# Patient Record
Sex: FEMALE | Race: OTHER | HISPANIC OR LATINO | URBAN - METROPOLITAN AREA
[De-identification: names, ages, dates, MRNs, and addresses within clinical notes are randomized per-mention and may not be internally consistent; named-entity substitution may affect disease eponyms.]

---

## 2017-08-01 ENCOUNTER — EMERGENCY (EMERGENCY)
Facility: HOSPITAL | Age: 27
LOS: 1 days | Discharge: HOME | End: 2017-08-01
Attending: EMERGENCY MEDICINE
Payer: COMMERCIAL

## 2017-08-01 VITALS
TEMPERATURE: 98 F | HEART RATE: 82 BPM | HEIGHT: 61 IN | WEIGHT: 125 LBS | OXYGEN SATURATION: 99 % | SYSTOLIC BLOOD PRESSURE: 109 MMHG | RESPIRATION RATE: 18 BRPM | DIASTOLIC BLOOD PRESSURE: 69 MMHG

## 2017-08-01 LAB — HCG UR QL: NEGATIVE — SIGNIFICANT CHANGE UP

## 2017-08-01 PROCEDURE — 99284 EMERGENCY DEPT VISIT MOD MDM: CPT | Mod: 25

## 2017-08-01 RX ORDER — ACETAMINOPHEN 500 MG
650 TABLET ORAL ONCE
Qty: 0 | Refills: 0 | Status: COMPLETED | OUTPATIENT
Start: 2017-08-01 | End: 2017-08-01

## 2017-08-01 RX ADMIN — Medication 650 MILLIGRAM(S): at 22:53

## 2017-08-01 RX ADMIN — Medication 650 MILLIGRAM(S): at 23:30

## 2017-08-01 NOTE — ED PROVIDER NOTE - ATTENDING CONTRIBUTION TO CARE
SEEN WITH PA. NICCI IN FACE DURING MARTIAL ARTS LESSON. DIZZY. PAIN TO FACE. HEADACHE. WILL CHECK CTS HEAD AND MAXILLOFACIAL AND REEVALUATE

## 2017-08-01 NOTE — ED PROVIDER NOTE - PHYSICAL EXAMINATION
PE- Well developed, well-nourish, resting comfortably in NAD. Ears: No hemotympanum Nose: no epistaxis, no septal hematoma Eyes: PERRLA b/l, EOM intact b/l with no nystagmus or pain with EOM.  Mouth: no loose teeth. Face: No raccoon eyes. No gonzalez signs. No tenderness to maxilla or mandible. Able to open and close mouth with no pain. TTP to right lower orbit with associated bruising. No step offs. Head: No contusions Cardiac- +S1, S2, without murmurs, rubs, or gallops. Pulm- lungs CTA without wheezes, ronchi, or rales. No retractions.  Abdomen- No seatbelt sign. BS normoactive. Soft, nontender to palpation. MS: No bruising to back. No step-offs. No midline tenderness. Neuro: CN 2-12 intact. Reflexes intact. Gait intact. Negative pronator. Negative rhomberg. Finger to nose intact. Rapid alternating motion intact. Heel to shin intact. No focal deficits, A&Ox3, no gross sensory or motor deficits. Plan: Will f/u plan per intake physician. PE- Well developed, well-nourish, resting comfortably in NAD. Ears: No hemotympanum Nose: no epistaxis, no septal hematoma Eyes: PERRLA b/l, EOM intact b/l with no nystagmus or pain with EOM. No limitation of eye movement in all directions.  Mouth: no loose teeth. Face: No raccoon eyes. No gonzalez signs. No tenderness to maxilla or mandible. Able to open and close mouth with no pain. TTP to right lower orbit with associated small bruise. No step offs. No crepitus. Head: No contusions Cardiac- +S1, S2, without murmurs, rubs, or gallops. Pulm- lungs CTA without wheezes, ronchi, or rales. No retractions.  Abdomen- No seatbelt sign. BS normoactive. Soft, nontender to palpation. MS: No bruising to back. No step-offs. No midline tenderness. Neuro: CN 2-12 intact. Reflexes intact. Gait intact. Negative pronator. Negative rhomberg. Finger to nose intact. Rapid alternating motion intact. Heel to shin intact. No focal deficits, A&Ox3, no gross sensory or motor deficits. Plan: Will f/u plan per intake physician. PE- Well developed, well-nourish, resting comfortably in NAD. Ears: No hemotympanum Nose: no epistaxis, no septal hematoma Eyes: PERRLA b/l, EOM intact b/l with no nystagmus or pain with EOM. No limitation of eye movement in all directions.  No pain to eye. 20/20 b/l Mouth: no loose teeth. Face: No raccoon eyes. No gonzalez signs. No tenderness to maxilla or mandible. Able to open and close mouth with no pain. TTP to right lower orbit with associated small bruise. No step offs. No crepitus. Head: No contusions Cardiac- +S1, S2, without murmurs, rubs, or gallops. Pulm- lungs CTA without wheezes, ronchi, or rales. No retractions.  Abdomen- No seatbelt sign. BS normoactive. Soft, nontender to palpation. MS: No bruising to back. No step-offs. No midline tenderness. Neuro: CN 2-12 intact. Reflexes intact. Gait intact. Negative pronator. Negative rhomberg. Finger to nose intact. Rapid alternating motion intact. Heel to shin intact. No focal deficits, A&Ox3, no gross sensory or motor deficits. Plan: Will f/u plan per intake physician.

## 2017-08-01 NOTE — ED PROVIDER NOTE - OBJECTIVE STATEMENT
This is a 25 y/o female presenting to the ED after getting a knee to her right lower orbit during wresting during jiu jitsu 2 hours ago.  Pt admits to headache, nausea, dizziness. Also admits to bruising to right lower orbit and tenderness to this area. She denies bleeding or clotting d/o, confusion, gait instability, blurry vision, loc, pain with EOM.

## 2017-08-01 NOTE — ED PROVIDER NOTE - MEDICAL DECISION MAKING DETAILS
Orbital Wall Fracture: CT shows fx. Counseled pt on all aspects of CT results. No clinical evidence of entrapment. No blowing nose. Afrin. F/u with plastics this week. Motrin for pain.

## 2017-08-02 PROCEDURE — 81025 URINE PREGNANCY TEST: CPT

## 2017-08-02 PROCEDURE — 70450 CT HEAD/BRAIN W/O DYE: CPT | Mod: 26

## 2017-08-02 PROCEDURE — 70450 CT HEAD/BRAIN W/O DYE: CPT

## 2017-08-02 PROCEDURE — 70486 CT MAXILLOFACIAL W/O DYE: CPT | Mod: 26

## 2017-08-02 PROCEDURE — 99284 EMERGENCY DEPT VISIT MOD MDM: CPT | Mod: 25

## 2017-08-02 PROCEDURE — 70486 CT MAXILLOFACIAL W/O DYE: CPT

## 2017-08-02 RX ORDER — OXYMETAZOLINE HYDROCHLORIDE 0.5 MG/ML
1 SPRAY NASAL ONCE
Qty: 0 | Refills: 0 | Status: COMPLETED | OUTPATIENT
Start: 2017-08-02 | End: 2017-08-02

## 2017-08-02 RX ADMIN — OXYMETAZOLINE HYDROCHLORIDE 1 SPRAY(S): 0.5 SPRAY NASAL at 01:40

## 2020-04-26 ENCOUNTER — MESSAGE (OUTPATIENT)
Age: 30
End: 2020-04-26

## 2021-11-01 ENCOUNTER — EMERGENCY (EMERGENCY)
Facility: HOSPITAL | Age: 31
LOS: 1 days | Discharge: HOME | End: 2021-11-01
Payer: COMMERCIAL

## 2021-11-01 VITALS
HEIGHT: 61 IN | DIASTOLIC BLOOD PRESSURE: 71 MMHG | HEART RATE: 78 BPM | OXYGEN SATURATION: 98 % | RESPIRATION RATE: 20 BRPM | TEMPERATURE: 98 F | SYSTOLIC BLOOD PRESSURE: 107 MMHG

## 2021-11-01 LAB — SARS-COV-2 RNA SPEC QL NAA+PROBE: SIGNIFICANT CHANGE UP

## 2021-11-01 PROCEDURE — U0003: CPT

## 2021-11-01 PROCEDURE — 99282 EMERGENCY DEPT VISIT SF MDM: CPT

## 2021-11-01 PROCEDURE — U0005: CPT

## 2021-11-01 PROCEDURE — 99283 EMERGENCY DEPT VISIT LOW MDM: CPT

## 2021-11-01 NOTE — ED ADULT TRIAGE NOTE - TEMPERATURE IN CELSIUS (DEGREES C)
36.7 Modified Advancement Flap Text: The defect edges were debeveled with a #15 scalpel blade.  Given the location of the defect, shape of the defect and the proximity to free margins a modified advancement flap was deemed most appropriate.  Using a sterile surgical marker, an appropriate advancement flap was drawn incorporating the defect and placing the expected incisions within the relaxed skin tension lines where possible.    The area thus outlined was incised deep to adipose tissue with a #15 scalpel blade.  The skin margins were undermined to an appropriate distance in all directions utilizing iris scissors.

## 2021-11-01 NOTE — ED PROVIDER NOTE - PATIENT PORTAL LINK FT
You can access the FollowMyHealth Patient Portal offered by [facility] by registering at the following website: [email or web address]. By joining [employer]’s FollowMyHealth portal, you will also be able to view your health information using other applications (apps) compatible with our system.

## 2021-11-01 NOTE — ED PROVIDER NOTE - OBJECTIVE STATEMENT
Nursing Note by Mallorie Orr CMA at 10/23/18 01:41 PM     Author:  Mallorie Orr CMA Service:  (none) Author Type:  Certified Medical Assistant     Filed:  10/23/18 01:41 PM Encounter Date:  10/23/2018 Status:  Signed     :  Mallorie Orr CMA (Certified Medical Assistant)            If provider orders tests at today's visit, patient would like to be contacted via phone, patient's preferred phone # is [phone] (home) and it is ok to leave a detailed message on voice mail or with family member.  If medications are ordered at today's visit, the pharmacy name/location patient would like them to be sent to is [facility][LP1.1C]      Revision History        User Key Date/Time User Provider Type Action    > LP1.1 10/23/18 01:41 PM Mallorie Orr CMA Certified Medical Assistant Sign    C - Copied            
29 y/o F requesting covid test for travel.  Denies symptoms.

## 2022-03-30 ENCOUNTER — APPOINTMENT (OUTPATIENT)
Dept: OBGYN | Facility: CLINIC | Age: 32
End: 2022-03-30
Payer: MEDICAID

## 2022-03-30 VITALS
HEIGHT: 61 IN | BODY MASS INDEX: 24.35 KG/M2 | DIASTOLIC BLOOD PRESSURE: 64 MMHG | SYSTOLIC BLOOD PRESSURE: 96 MMHG | WEIGHT: 129 LBS

## 2022-03-30 DIAGNOSIS — Z87.42 PERSONAL HISTORY OF OTHER DISEASES OF THE FEMALE GENITAL TRACT: ICD-10-CM

## 2022-03-30 DIAGNOSIS — Z80.3 FAMILY HISTORY OF MALIGNANT NEOPLASM OF BREAST: ICD-10-CM

## 2022-03-30 DIAGNOSIS — Z78.9 OTHER SPECIFIED HEALTH STATUS: ICD-10-CM

## 2022-03-30 PROCEDURE — 99203 OFFICE O/P NEW LOW 30 MIN: CPT

## 2022-03-31 ENCOUNTER — LABORATORY RESULT (OUTPATIENT)
Age: 32
End: 2022-03-31

## 2022-04-19 ENCOUNTER — NON-APPOINTMENT (OUTPATIENT)
Age: 32
End: 2022-04-19

## 2022-04-20 ENCOUNTER — APPOINTMENT (OUTPATIENT)
Dept: ANTEPARTUM | Facility: CLINIC | Age: 32
End: 2022-04-20
Payer: MEDICAID

## 2022-04-20 ENCOUNTER — APPOINTMENT (OUTPATIENT)
Dept: OBGYN | Facility: CLINIC | Age: 32
End: 2022-04-20
Payer: MEDICAID

## 2022-04-20 ENCOUNTER — ASOB RESULT (OUTPATIENT)
Age: 32
End: 2022-04-20

## 2022-04-20 VITALS
SYSTOLIC BLOOD PRESSURE: 110 MMHG | HEIGHT: 61 IN | DIASTOLIC BLOOD PRESSURE: 70 MMHG | WEIGHT: 131 LBS | BODY MASS INDEX: 24.73 KG/M2

## 2022-04-20 PROCEDURE — 99213 OFFICE O/P EST LOW 20 MIN: CPT | Mod: TH

## 2022-04-20 PROCEDURE — 76805 OB US >/= 14 WKS SNGL FETUS: CPT

## 2022-04-20 PROCEDURE — 76817 TRANSVAGINAL US OBSTETRIC: CPT

## 2022-05-19 ENCOUNTER — APPOINTMENT (OUTPATIENT)
Dept: OBGYN | Facility: CLINIC | Age: 32
End: 2022-05-19
Payer: MEDICAID

## 2022-05-19 VITALS
SYSTOLIC BLOOD PRESSURE: 126 MMHG | DIASTOLIC BLOOD PRESSURE: 82 MMHG | HEIGHT: 61 IN | BODY MASS INDEX: 25.86 KG/M2 | WEIGHT: 137 LBS

## 2022-05-19 PROCEDURE — 99213 OFFICE O/P EST LOW 20 MIN: CPT | Mod: TH

## 2022-05-26 ENCOUNTER — LABORATORY RESULT (OUTPATIENT)
Age: 32
End: 2022-05-26

## 2022-06-14 ENCOUNTER — NON-APPOINTMENT (OUTPATIENT)
Age: 32
End: 2022-06-14

## 2022-06-15 ENCOUNTER — APPOINTMENT (OUTPATIENT)
Dept: OBGYN | Facility: CLINIC | Age: 32
End: 2022-06-15
Payer: MEDICAID

## 2022-06-15 VITALS
DIASTOLIC BLOOD PRESSURE: 60 MMHG | SYSTOLIC BLOOD PRESSURE: 100 MMHG | WEIGHT: 140 LBS | BODY MASS INDEX: 26.43 KG/M2 | HEIGHT: 61 IN

## 2022-06-15 PROCEDURE — 99213 OFFICE O/P EST LOW 20 MIN: CPT | Mod: TH

## 2022-07-07 ENCOUNTER — APPOINTMENT (OUTPATIENT)
Dept: OBGYN | Facility: CLINIC | Age: 32
End: 2022-07-07

## 2022-07-07 VITALS
DIASTOLIC BLOOD PRESSURE: 60 MMHG | WEIGHT: 144 LBS | HEIGHT: 61 IN | BODY MASS INDEX: 27.19 KG/M2 | SYSTOLIC BLOOD PRESSURE: 104 MMHG

## 2022-07-07 PROCEDURE — 99213 OFFICE O/P EST LOW 20 MIN: CPT | Mod: TH

## 2022-07-20 ENCOUNTER — APPOINTMENT (OUTPATIENT)
Dept: OBGYN | Facility: CLINIC | Age: 32
End: 2022-07-20

## 2022-07-20 ENCOUNTER — ASOB RESULT (OUTPATIENT)
Age: 32
End: 2022-07-20

## 2022-07-20 ENCOUNTER — APPOINTMENT (OUTPATIENT)
Dept: ANTEPARTUM | Facility: CLINIC | Age: 32
End: 2022-07-20

## 2022-07-20 VITALS
WEIGHT: 145 LBS | HEIGHT: 61 IN | SYSTOLIC BLOOD PRESSURE: 110 MMHG | DIASTOLIC BLOOD PRESSURE: 70 MMHG | BODY MASS INDEX: 27.38 KG/M2

## 2022-07-20 PROCEDURE — 76816 OB US FOLLOW-UP PER FETUS: CPT

## 2022-07-20 PROCEDURE — 99213 OFFICE O/P EST LOW 20 MIN: CPT | Mod: TH

## 2022-08-02 ENCOUNTER — NON-APPOINTMENT (OUTPATIENT)
Age: 32
End: 2022-08-02

## 2022-08-03 ENCOUNTER — APPOINTMENT (OUTPATIENT)
Dept: OBGYN | Facility: CLINIC | Age: 32
End: 2022-08-03

## 2022-08-03 VITALS
WEIGHT: 145 LBS | DIASTOLIC BLOOD PRESSURE: 74 MMHG | HEIGHT: 61 IN | SYSTOLIC BLOOD PRESSURE: 110 MMHG | BODY MASS INDEX: 27.38 KG/M2

## 2022-08-03 PROCEDURE — 99213 OFFICE O/P EST LOW 20 MIN: CPT | Mod: TH

## 2022-08-05 LAB
GP B STREP DNA SPEC QL NAA+PROBE: NORMAL
GP B STREP DNA SPEC QL NAA+PROBE: NOT DETECTED
SOURCE GBS: NORMAL

## 2022-08-10 ENCOUNTER — ASOB RESULT (OUTPATIENT)
Age: 32
End: 2022-08-10

## 2022-08-10 ENCOUNTER — NON-APPOINTMENT (OUTPATIENT)
Age: 32
End: 2022-08-10

## 2022-08-10 ENCOUNTER — APPOINTMENT (OUTPATIENT)
Dept: OBGYN | Facility: CLINIC | Age: 32
End: 2022-08-10

## 2022-08-10 ENCOUNTER — APPOINTMENT (OUTPATIENT)
Dept: ANTEPARTUM | Facility: CLINIC | Age: 32
End: 2022-08-10

## 2022-08-10 VITALS
WEIGHT: 148 LBS | SYSTOLIC BLOOD PRESSURE: 110 MMHG | HEIGHT: 61 IN | DIASTOLIC BLOOD PRESSURE: 70 MMHG | BODY MASS INDEX: 27.94 KG/M2

## 2022-08-10 LAB
BASOPHILS # BLD AUTO: 0.03 K/UL
BASOPHILS NFR BLD AUTO: 0.4 %
EOSINOPHIL # BLD AUTO: 0.04 K/UL
EOSINOPHIL NFR BLD AUTO: 0.5 %
HCT VFR BLD CALC: 34.1 %
HGB BLD-MCNC: 11 G/DL
HIV1+2 AB SPEC QL IA.RAPID: NONREACTIVE
IMM GRANULOCYTES NFR BLD AUTO: 0.9 %
LYMPHOCYTES # BLD AUTO: 1.35 K/UL
LYMPHOCYTES NFR BLD AUTO: 18.1 %
MAN DIFF?: NORMAL
MCHC RBC-ENTMCNC: 28.7 PG
MCHC RBC-ENTMCNC: 32.3 GM/DL
MCV RBC AUTO: 89 FL
MONOCYTES # BLD AUTO: 0.48 K/UL
MONOCYTES NFR BLD AUTO: 6.4 %
NEUTROPHILS # BLD AUTO: 5.49 K/UL
NEUTROPHILS NFR BLD AUTO: 73.7 %
PLATELET # BLD AUTO: 222 K/UL
RBC # BLD: 3.83 M/UL
RBC # FLD: 13.8 %
T PALLIDUM AB SER QL IA: NEGATIVE
WBC # FLD AUTO: 7.46 K/UL

## 2022-08-10 PROCEDURE — 76819 FETAL BIOPHYS PROFIL W/O NST: CPT

## 2022-08-10 PROCEDURE — 99213 OFFICE O/P EST LOW 20 MIN: CPT | Mod: TH

## 2022-08-10 PROCEDURE — 76816 OB US FOLLOW-UP PER FETUS: CPT

## 2022-08-17 ENCOUNTER — APPOINTMENT (OUTPATIENT)
Dept: OBGYN | Facility: CLINIC | Age: 32
End: 2022-08-17

## 2022-08-17 VITALS
DIASTOLIC BLOOD PRESSURE: 70 MMHG | HEIGHT: 61 IN | SYSTOLIC BLOOD PRESSURE: 110 MMHG | WEIGHT: 149 LBS | BODY MASS INDEX: 28.13 KG/M2

## 2022-08-17 PROCEDURE — 99213 OFFICE O/P EST LOW 20 MIN: CPT | Mod: TH

## 2022-08-26 ENCOUNTER — APPOINTMENT (OUTPATIENT)
Dept: OBGYN | Facility: CLINIC | Age: 32
End: 2022-08-26

## 2022-08-26 VITALS
WEIGHT: 149 LBS | BODY MASS INDEX: 28.13 KG/M2 | DIASTOLIC BLOOD PRESSURE: 70 MMHG | HEIGHT: 61 IN | SYSTOLIC BLOOD PRESSURE: 100 MMHG

## 2022-08-26 PROCEDURE — 99213 OFFICE O/P EST LOW 20 MIN: CPT | Mod: TH

## 2022-08-30 ENCOUNTER — NON-APPOINTMENT (OUTPATIENT)
Age: 32
End: 2022-08-30

## 2022-08-31 ENCOUNTER — APPOINTMENT (OUTPATIENT)
Dept: OBGYN | Facility: CLINIC | Age: 32
End: 2022-08-31

## 2022-08-31 VITALS
SYSTOLIC BLOOD PRESSURE: 110 MMHG | WEIGHT: 150 LBS | DIASTOLIC BLOOD PRESSURE: 72 MMHG | HEIGHT: 61 IN | HEART RATE: 97 BPM | BODY MASS INDEX: 28.32 KG/M2

## 2022-08-31 DIAGNOSIS — Z34.92 ENCOUNTER FOR SUPERVISION OF NORMAL PREGNANCY, UNSPECIFIED, SECOND TRIMESTER: ICD-10-CM

## 2022-08-31 PROCEDURE — 76818 FETAL BIOPHYS PROFILE W/NST: CPT

## 2022-08-31 PROCEDURE — 76815 OB US LIMITED FETUS(S): CPT

## 2022-08-31 PROCEDURE — 99213 OFFICE O/P EST LOW 20 MIN: CPT | Mod: TH

## 2022-09-02 ENCOUNTER — INPATIENT (INPATIENT)
Facility: HOSPITAL | Age: 32
LOS: 2 days | Discharge: HOME | End: 2022-09-05
Attending: OBSTETRICS & GYNECOLOGY | Admitting: OBSTETRICS & GYNECOLOGY
Payer: COMMERCIAL

## 2022-09-02 ENCOUNTER — APPOINTMENT (OUTPATIENT)
Dept: OBGYN | Facility: CLINIC | Age: 32
End: 2022-09-02

## 2022-09-02 VITALS — TEMPERATURE: 98 F | WEIGHT: 151.02 LBS | HEIGHT: 61 IN | RESPIRATION RATE: 16 BRPM

## 2022-09-02 VITALS
BODY MASS INDEX: 28.51 KG/M2 | WEIGHT: 151 LBS | SYSTOLIC BLOOD PRESSURE: 118 MMHG | HEIGHT: 61 IN | DIASTOLIC BLOOD PRESSURE: 80 MMHG

## 2022-09-02 DIAGNOSIS — O47.1 FALSE LABOR AT OR AFTER 37 COMPLETED WEEKS OF GESTATION: ICD-10-CM

## 2022-09-02 DIAGNOSIS — O48.0 POST-TERM PREGNANCY: ICD-10-CM

## 2022-09-02 DIAGNOSIS — Z98.890 OTHER SPECIFIED POSTPROCEDURAL STATES: Chronic | ICD-10-CM

## 2022-09-02 DIAGNOSIS — Z3A.40 40 WEEKS GESTATION OF PREGNANCY: ICD-10-CM

## 2022-09-02 DIAGNOSIS — Z34.93 ENCOUNTER FOR SUPERVISION OF NORMAL PREGNANCY, UNSPECIFIED, THIRD TRIMESTER: ICD-10-CM

## 2022-09-02 DIAGNOSIS — O41.03X0 OLIGOHYDRAMNIOS, THIRD TRIMESTER, NOT APPLICABLE OR UNSPECIFIED: ICD-10-CM

## 2022-09-02 DIAGNOSIS — O41.00X0 OLIGOHYDRAMNIOS, UNSPECIFIED TRIMESTER, NOT APPLICABLE OR UNSPECIFIED: ICD-10-CM

## 2022-09-02 DIAGNOSIS — O41.03X1 OLIGOHYDRAMNIOS, THIRD TRIMESTER, FETUS 1: ICD-10-CM

## 2022-09-02 LAB
BASOPHILS # BLD AUTO: 0.03 K/UL — SIGNIFICANT CHANGE UP (ref 0–0.2)
BASOPHILS NFR BLD AUTO: 0.3 % — SIGNIFICANT CHANGE UP (ref 0–2)
BLD GP AB SCN SERPL QL: SIGNIFICANT CHANGE UP
EOSINOPHIL # BLD AUTO: 0.05 K/UL — SIGNIFICANT CHANGE UP (ref 0–0.5)
EOSINOPHIL NFR BLD AUTO: 0.5 % — SIGNIFICANT CHANGE UP (ref 0–6)
HCT VFR BLD CALC: 34 % — LOW (ref 34.5–45)
HGB BLD-MCNC: 11.1 G/DL — LOW (ref 11.5–15.5)
IMM GRANULOCYTES NFR BLD AUTO: 0.7 % — SIGNIFICANT CHANGE UP (ref 0–1.5)
LYMPHOCYTES # BLD AUTO: 1.75 K/UL — SIGNIFICANT CHANGE UP (ref 1–3.3)
LYMPHOCYTES # BLD AUTO: 17.2 % — SIGNIFICANT CHANGE UP (ref 13–44)
MCHC RBC-ENTMCNC: 28.1 PG — SIGNIFICANT CHANGE UP (ref 27–34)
MCHC RBC-ENTMCNC: 32.6 GM/DL — SIGNIFICANT CHANGE UP (ref 32–36)
MCV RBC AUTO: 86.1 FL — SIGNIFICANT CHANGE UP (ref 80–100)
MONOCYTES # BLD AUTO: 0.81 K/UL — SIGNIFICANT CHANGE UP (ref 0–0.9)
MONOCYTES NFR BLD AUTO: 8 % — SIGNIFICANT CHANGE UP (ref 2–14)
NEUTROPHILS # BLD AUTO: 7.47 K/UL — HIGH (ref 1.8–7.4)
NEUTROPHILS NFR BLD AUTO: 73.3 % — SIGNIFICANT CHANGE UP (ref 43–77)
PLATELET # BLD AUTO: 231 K/UL — SIGNIFICANT CHANGE UP (ref 150–400)
RBC # BLD: 3.95 M/UL — SIGNIFICANT CHANGE UP (ref 3.8–5.2)
RBC # FLD: 13.5 % — SIGNIFICANT CHANGE UP (ref 10.3–14.5)
WBC # BLD: 10.18 K/UL — SIGNIFICANT CHANGE UP (ref 3.8–10.5)
WBC # FLD AUTO: 10.18 K/UL — SIGNIFICANT CHANGE UP (ref 3.8–10.5)

## 2022-09-02 PROCEDURE — 76818 FETAL BIOPHYS PROFILE W/NST: CPT

## 2022-09-02 PROCEDURE — 99213 OFFICE O/P EST LOW 20 MIN: CPT | Mod: TH

## 2022-09-02 RX ORDER — CHLORHEXIDINE GLUCONATE 213 G/1000ML
1 SOLUTION TOPICAL ONCE
Refills: 0 | Status: DISCONTINUED | OUTPATIENT
Start: 2022-09-02 | End: 2022-09-03

## 2022-09-02 RX ORDER — CITRIC ACID/SODIUM CITRATE 300-500 MG
30 SOLUTION, ORAL ORAL ONCE
Refills: 0 | Status: DISCONTINUED | OUTPATIENT
Start: 2022-09-02 | End: 2022-09-03

## 2022-09-02 RX ORDER — OXYTOCIN 10 UNIT/ML
333.33 VIAL (ML) INJECTION
Qty: 20 | Refills: 0 | Status: DISCONTINUED | OUTPATIENT
Start: 2022-09-02 | End: 2022-09-05

## 2022-09-02 RX ORDER — SODIUM CHLORIDE 9 MG/ML
1000 INJECTION, SOLUTION INTRAVENOUS
Refills: 0 | Status: DISCONTINUED | OUTPATIENT
Start: 2022-09-02 | End: 2022-09-03

## 2022-09-02 RX ORDER — DINOPROSTONE 10 MG/241MG
10 INSERT VAGINAL ONCE
Refills: 0 | Status: DISCONTINUED | OUTPATIENT
Start: 2022-09-02 | End: 2022-09-03

## 2022-09-02 RX ADMIN — SODIUM CHLORIDE 125 MILLILITER(S): 9 INJECTION, SOLUTION INTRAVENOUS at 19:36

## 2022-09-02 NOTE — OB RN PATIENT PROFILE - FALL HARM RISK - UNIVERSAL INTERVENTIONS
Bed in lowest position, wheels locked, appropriate side rails in place/Call bell, personal items and telephone in reach/Instruct patient to call for assistance before getting out of bed or chair/Non-slip footwear when patient is out of bed/[place] to call system/Physically safe environment - no spills, clutter or unnecessary equipment/Purposeful Proactive Rounding/Room/bathroom lighting operational, light cord in reach

## 2022-09-02 NOTE — OB PROVIDER H&P - ASSESSMENT
Patient is a 31 y.o.  at 40 weeks 3 days gestation admitted for IOL for oligohydramnios. Cat 1 FHT.     - consent  - admission labs  - IVF  - GBS negative, no ppx required  - IOL with cervical ripening balloon as patient is iva too often for medical induction  - continuous toco and fetal heart monitoring  - pain management: epidural PRN    D/w Dr. Sargent

## 2022-09-02 NOTE — OB PROVIDER H&P - HISTORY OF PRESENT ILLNESS
Марина Arredondo is a 31 y.o.  at 40 weeks 3 days gestation who presents to L&D for a scheduled IOL secondary to oligohydramnios.   +FM, - LOF, - VB, +CTX.     ObHx:  - TOP at "5 months", s/p D&E  GynHx: denies abnormal paps, STIs, ovarian cysts, fibroids  PMH: denies  PSH: denies  Meds: PNV  All: NKDA  SocialHx: denies alcohol, tobacco use, and drug use. Feels safe at home.

## 2022-09-02 NOTE — OB PROVIDER H&P - NSHPPHYSICALEXAM_GEN_ALL_CORE
Vital Signs Last 24 Hrs  T(C): 36.9 (02 Sep 2022 18:58), Max: 36.9 (02 Sep 2022 18:58)  T(F): 98.4 (02 Sep 2022 18:58), Max: 98.4 (02 Sep 2022 18:58)  HR: 78 (02 Sep 2022 19:02) (78 - 78)  BP: 114/71 (02 Sep 2022 19:02) (114/71 - 114/71)  RR: 16 (02 Sep 2022 18:58) (16 - 16)      Parameters below as of 02 Sep 2022 18:58  Patient On (Oxygen Delivery Method): room air    Gen: NAD  Abd: gravid, non-tender  SVE: 1/60/-2 on recheck 2 hours later, same exam  FHT: 135 baseline, moderate variability, + accels, - decels  Balm: q2-3 minutes  Bedside US: vertex, anterior placenta

## 2022-09-03 ENCOUNTER — TRANSCRIPTION ENCOUNTER (OUTPATIENT)
Age: 32
End: 2022-09-03

## 2022-09-03 LAB
ABO RH CONFIRMATION: SIGNIFICANT CHANGE UP
COVID-19 SPIKE DOMAIN AB INTERP: POSITIVE
COVID-19 SPIKE DOMAIN ANTIBODY RESULT: >250 U/ML — HIGH
SARS-COV-2 IGG+IGM SERPL QL IA: >250 U/ML — HIGH
SARS-COV-2 IGG+IGM SERPL QL IA: POSITIVE
SARS-COV-2 RNA SPEC QL NAA+PROBE: SIGNIFICANT CHANGE UP
T PALLIDUM AB TITR SER: NEGATIVE — SIGNIFICANT CHANGE UP

## 2022-09-03 PROCEDURE — 59409 OBSTETRICAL CARE: CPT | Mod: U9

## 2022-09-03 RX ORDER — IBUPROFEN 200 MG
600 TABLET ORAL EVERY 6 HOURS
Refills: 0 | Status: COMPLETED | OUTPATIENT
Start: 2022-09-03 | End: 2023-08-02

## 2022-09-03 RX ORDER — AER TRAVELER 0.5 G/1
1 SOLUTION RECTAL; TOPICAL EVERY 4 HOURS
Refills: 0 | Status: DISCONTINUED | OUTPATIENT
Start: 2022-09-03 | End: 2022-09-05

## 2022-09-03 RX ORDER — OXYCODONE HYDROCHLORIDE 5 MG/1
5 TABLET ORAL
Refills: 0 | Status: DISCONTINUED | OUTPATIENT
Start: 2022-09-03 | End: 2022-09-05

## 2022-09-03 RX ORDER — DIPHENHYDRAMINE HCL 50 MG
25 CAPSULE ORAL EVERY 6 HOURS
Refills: 0 | Status: DISCONTINUED | OUTPATIENT
Start: 2022-09-03 | End: 2022-09-05

## 2022-09-03 RX ORDER — IBUPROFEN 200 MG
600 TABLET ORAL EVERY 6 HOURS
Refills: 0 | Status: DISCONTINUED | OUTPATIENT
Start: 2022-09-03 | End: 2022-09-05

## 2022-09-03 RX ORDER — SIMETHICONE 80 MG/1
80 TABLET, CHEWABLE ORAL EVERY 4 HOURS
Refills: 0 | Status: DISCONTINUED | OUTPATIENT
Start: 2022-09-03 | End: 2022-09-05

## 2022-09-03 RX ORDER — PRAMOXINE HYDROCHLORIDE 150 MG/15G
1 AEROSOL, FOAM RECTAL EVERY 4 HOURS
Refills: 0 | Status: DISCONTINUED | OUTPATIENT
Start: 2022-09-03 | End: 2022-09-05

## 2022-09-03 RX ORDER — MAGNESIUM HYDROXIDE 400 MG/1
30 TABLET, CHEWABLE ORAL
Refills: 0 | Status: DISCONTINUED | OUTPATIENT
Start: 2022-09-03 | End: 2022-09-05

## 2022-09-03 RX ORDER — SODIUM CHLORIDE 9 MG/ML
3 INJECTION INTRAMUSCULAR; INTRAVENOUS; SUBCUTANEOUS EVERY 8 HOURS
Refills: 0 | Status: DISCONTINUED | OUTPATIENT
Start: 2022-09-03 | End: 2022-09-05

## 2022-09-03 RX ORDER — BENZOCAINE 10 %
1 GEL (GRAM) MUCOUS MEMBRANE EVERY 6 HOURS
Refills: 0 | Status: DISCONTINUED | OUTPATIENT
Start: 2022-09-03 | End: 2022-09-05

## 2022-09-03 RX ORDER — OXYTOCIN 10 UNIT/ML
VIAL (ML) INJECTION
Qty: 30 | Refills: 0 | Status: DISCONTINUED | OUTPATIENT
Start: 2022-09-03 | End: 2022-09-03

## 2022-09-03 RX ORDER — ONDANSETRON 8 MG/1
4 TABLET, FILM COATED ORAL ONCE
Refills: 0 | Status: COMPLETED | OUTPATIENT
Start: 2022-09-03 | End: 2022-09-03

## 2022-09-03 RX ORDER — DIBUCAINE 1 %
1 OINTMENT (GRAM) RECTAL EVERY 6 HOURS
Refills: 0 | Status: DISCONTINUED | OUTPATIENT
Start: 2022-09-03 | End: 2022-09-05

## 2022-09-03 RX ORDER — LANOLIN
1 OINTMENT (GRAM) TOPICAL EVERY 6 HOURS
Refills: 0 | Status: DISCONTINUED | OUTPATIENT
Start: 2022-09-03 | End: 2022-09-05

## 2022-09-03 RX ORDER — ACETAMINOPHEN 500 MG
975 TABLET ORAL
Refills: 0 | Status: DISCONTINUED | OUTPATIENT
Start: 2022-09-03 | End: 2022-09-05

## 2022-09-03 RX ORDER — KETOROLAC TROMETHAMINE 30 MG/ML
30 SYRINGE (ML) INJECTION ONCE
Refills: 0 | Status: DISCONTINUED | OUTPATIENT
Start: 2022-09-03 | End: 2022-09-05

## 2022-09-03 RX ORDER — TETANUS TOXOID, REDUCED DIPHTHERIA TOXOID AND ACELLULAR PERTUSSIS VACCINE, ADSORBED 5; 2.5; 8; 8; 2.5 [IU]/.5ML; [IU]/.5ML; UG/.5ML; UG/.5ML; UG/.5ML
0.5 SUSPENSION INTRAMUSCULAR ONCE
Refills: 0 | Status: DISCONTINUED | OUTPATIENT
Start: 2022-09-03 | End: 2022-09-05

## 2022-09-03 RX ORDER — OXYTOCIN 10 UNIT/ML
333.33 VIAL (ML) INJECTION
Qty: 20 | Refills: 0 | Status: DISCONTINUED | OUTPATIENT
Start: 2022-09-03 | End: 2022-09-05

## 2022-09-03 RX ORDER — OXYCODONE HYDROCHLORIDE 5 MG/1
5 TABLET ORAL ONCE
Refills: 0 | Status: DISCONTINUED | OUTPATIENT
Start: 2022-09-03 | End: 2022-09-05

## 2022-09-03 RX ORDER — HYDROCORTISONE 1 %
1 OINTMENT (GRAM) TOPICAL EVERY 6 HOURS
Refills: 0 | Status: DISCONTINUED | OUTPATIENT
Start: 2022-09-03 | End: 2022-09-05

## 2022-09-03 RX ADMIN — ONDANSETRON 4 MILLIGRAM(S): 8 TABLET, FILM COATED ORAL at 05:20

## 2022-09-03 RX ADMIN — Medication 2 MILLIUNIT(S)/MIN: at 04:47

## 2022-09-03 RX ADMIN — Medication 0.2 MILLIGRAM(S): at 18:35

## 2022-09-03 RX ADMIN — SODIUM CHLORIDE 3 MILLILITER(S): 9 INJECTION INTRAMUSCULAR; INTRAVENOUS; SUBCUTANEOUS at 22:00

## 2022-09-03 RX ADMIN — Medication 600 MILLIGRAM(S): at 23:10

## 2022-09-03 NOTE — OB RN DELIVERY SUMMARY - NSSELHIDDEN_OBGYN_ALL_OB_FT
[NS_DeliveryAttending1_OBGYN_ALL_OB_FT:[id number]],[NS_DeliveryAssist1_OBGYN_ALL_OB_FT:[id number]],[NS_DeliveryRN_OBGYN_ALL_OB_FT:[id number]=]

## 2022-09-03 NOTE — DISCHARGE NOTE OB - MEDICATION SUMMARY - MEDICATIONS TO TAKE
I will START or STAY ON the medications listed below when I get home from the hospital:    acetaminophen 500 mg oral tablet  -- 2 tab(s) by mouth every 6 hours   -- This product contains acetaminophen.  Do not use  with any other product containing acetaminophen to prevent possible liver damage.    -- Indication: For pain    ibuprofen 600 mg oral tablet  -- 1 tab(s) by mouth every 6 hours   -- Do not take this drug if you are pregnant.  It is very important that you take or use this exactly as directed.  Do not skip doses or discontinue unless directed by your doctor.  May cause drowsiness or dizziness.  Obtain medical advice before taking any non-prescription drugs as some may affect the action of this medication.  Take with food or milk.    -- Indication: For pain    Prenatal Multivitamins with Folic Acid 1 mg oral capsule  -- 1 cap(s) by mouth once a day   -- Indication: For Healthy mom and baby

## 2022-09-03 NOTE — DISCHARGE NOTE OB - PATIENT PORTAL LINK FT
You can access the FollowMyHealth Patient Portal offered by [facility] by registering at the following website: [email or web address]. By joining [employer]’s FollowMyHealth portal, you will also be able to view your health information using other applications (apps) compatible with our system.

## 2022-09-03 NOTE — OB POSTPARTUM EVENT NOTE - NS_EVENTSUMMARY1_OBGYN_ALL_OB_FT
As the patient began her second stage I was called to evaluate a postpartum hemorrhage in the recovery room C which required transfer to the OR. I assessed the patient's progress and returned to the OR, then returned as the baby was moved to the warmer and before the placenta was delivered. I was present for delivery of the placenta, inspection of the perineum and vaginal and present for the sudden increase in vaginal bleeding that required methergine in addition to the pitocin which was running. I returned to the OR and then returned to the patient's room to check on her bleeding and to explain why I arrived after the baby's birth. All questions were answered and patient has normal lochia.

## 2022-09-03 NOTE — DISCHARGE NOTE OB - CARE PLAN
1 Principal Discharge DX:	 (normal spontaneous vaginal delivery)  Assessment and plan of treatment:	Patient underwent a normal spontaneous vaginal delivery. Post-partum course was uncomplicated. Pain is well controlled with PRN medication. She has no difficulty with ambulation, voiding, or PO intake. Lab values and vital signs are within normal limits prior to discharge.  Please contact your provider for any pain uncontrolled by medication, excessive bleeding or Fever>100.4  1) Please take ibuprofen as needed for pain as prescribed.  2) Nothing in the vagina for 6 weeks (including no sex, no tampons, and no douching).  3) Please call your doctor for a follow up your postpartum appointment in 4-6 weeks.  4) Please continue taking vitamins postpartum. Take iron and colace for acute blood loss anemia.  5) Please call the office sooner if you have heavy vaginal bleeding, severe abdominal pain, or fever > 100.4F.  6) You may resume regular daily activity as tolerated

## 2022-09-03 NOTE — DISCHARGE NOTE OB - CARE PROVIDER_API CALL
Charles Stein (DO)  Obstetrics and Gynecology  [address] [place] [address]  Phone: [phone]  Fax: [phone]  Follow Up Time: 1 month

## 2022-09-03 NOTE — DISCHARGE NOTE OB - PLAN OF CARE
Patient underwent a normal spontaneous vaginal delivery. Post-partum course was uncomplicated. Pain is well controlled with PRN medication. She has no difficulty with ambulation, voiding, or PO intake. Lab values and vital signs are within normal limits prior to discharge.  Please contact your provider for any pain uncontrolled by medication, excessive bleeding or Fever>100.4  1) Please take ibuprofen as needed for pain as prescribed.  2) Nothing in the vagina for 6 weeks (including no sex, no tampons, and no douching).  3) Please call your doctor for a follow up your postpartum appointment in 4-6 weeks.  4) Please continue taking vitamins postpartum. Take iron and colace for acute blood loss anemia.  5) Please call the office sooner if you have heavy vaginal bleeding, severe abdominal pain, or fever > 100.4F.  6) You may resume regular daily activity as tolerated

## 2022-09-03 NOTE — OB PROVIDER DELIVERY SUMMARY - NSSELHIDDEN_OBGYN_ALL_OB_FT
[NS_DeliveryAttending1_OBGYN_ALL_OB_FT:[id number]],[NS_DeliveryAssist1_OBGYN_ALL_OB_FT:[id number]=]

## 2022-09-03 NOTE — OB RN DELIVERY SUMMARY - NS_SEPSISRSKCALC_OBGYN_ALL_OB_FT
No temperature has been documented for this patient in CPN or on the OB Flowsheet. Ensure the highest temperature during labor was documented on the OB Flowsheet.  No gestational age at birth has been documented. Ensure delivery date/time has been entered above.  Rupture of membranes must be entered above.   EOS calculated successfully. EOS Risk Factor: 0.5/1000 live births ([facility] [place]note 2[place] national incidence); GA=40w4d; Temp=98.7; ROM=5.717; GBS='Negative'; Antibiotics='No antibiotics or any antibiotics < 2 hrs prior to birth'

## 2022-09-03 NOTE — OB PROVIDER LABOR PROGRESS NOTE - NS_SUBJECTIVE/OBJECTIVE_OBGYN_ALL_OB_FT
Patient reports complaints of pressure with cervical ripening balloon.     Balloon previously inserted and inflated to 80cc intrauterine and 50cc vaginal.   20cc removed from uterine balloon.
Balloon out.  Resting comfortably post epidural  Pit @ 4u
Patient seen and examined at bedside. She is doing well. No complaints at this time.     Vital Signs Last 24 Hrs  T(C): 37.0 (03 Sep 2022 12:00), Max: 37.0 (03 Sep 2022 12:00)  T(F): 98.6 (03 Sep 2022 12:00), Max: 98.6 (03 Sep 2022 12:00)  HR: 78 (03 Sep 2022 12:20) (59 - 114)  BP: 97/53 (03 Sep 2022 12:20) (71/39 - 120/69)  RR: 16 (02 Sep 2022 18:58) (16 - 16)  SpO2: 99% (03 Sep 2022 02:28) (98% - 100%)    Parameters below as of 02 Sep 2022 18:58  Patient On (Oxygen Delivery Method): room air
Patient seen and examined at bedside. She is doing well. No complaints at this time.   Vital Signs Last 24 Hrs  T(C): 37.0 (03 Sep 2022 12:00), Max: 37.0 (03 Sep 2022 12:00)  T(F): 98.6 (03 Sep 2022 12:00), Max: 98.6 (03 Sep 2022 12:00)  HR: 75 (03 Sep 2022 16:06) (57 - 114)  BP: 131/68 (03 Sep 2022 16:06) (71/39 - 131/68)  RR: 16 (02 Sep 2022 18:58) (16 - 16)  SpO2: 99% (03 Sep 2022 02:28) (98% - 100%)    Parameters below as of 02 Sep 2022 18:58  Patient On (Oxygen Delivery Method): room air
Patient was feeling rectal pressure

## 2022-09-03 NOTE — OB PROVIDER LABOR PROGRESS NOTE - ASSESSMENT
Plan for AROM once head applied  C/w pitocin
Pt admitted for induction of labor.   -VSS   -Cat 1 tracing  -Atul irregularly   -AROM, clear   -Continue pitocin     D/W Dr. Chadwick 
Will get a epidural top off  Will continue to monitor  Cat I tracing 
31 y.o.  at 40w4d undergoing IOL for oligohydramnios, cook balloon in place. Cat 1 FHT.    - continue IOL with cook balloon  - continuous toco and fetal heart monitoring    D/w Dr. Sargent
Continue current management.

## 2022-09-03 NOTE — DISCHARGE NOTE OB - HOSPITAL COURSE
@ 40w3d IOL 2/2 oligo (DORENE 2.11), uncomplicated   Postpartum pain is well controlled with PRN medication. She has no difficulty with ambulation, voiding or PO intake. Lab values and vital signs are within normal limits prior to discharge.

## 2022-09-03 NOTE — OB PROVIDER DELIVERY SUMMARY - NSPROVIDERDELIVERYNOTE_OBGYN_ALL_OB_FT
Vaginal Delivery Summary    Procedure: Normal spontaneous vaginal delivery   Findings: Viable female infant delivered in cephalic presentation at 1820, placenta delivered at 1825.  Apgar scores 9/9   Weight will be recorded after 1 hour to allow for skin-to-skin contact.  Laceration(s): No laceration   Repair: N/A  EBL: [id number]  Complications: No complications     Procedure:   Patient felt rectal pressure and was found to be fully dilated, +2 station. She pushed effectively for 30 minutes. She delivered a viable female infant. Thick meconium was noted cord clamping was performed and baby was brought over to the warmer. Placenta delivered intact and pitocin was started at the delivery of placenta.  Perineum and vagina were inspected, no lacerations present. Intermittent lower uterine segment atony was noted. She received 1 dose of methergine IM. Adequate hemostasis was obtained. Vaginal Delivery Summary    Procedure: Normal spontaneous vaginal delivery   Findings: Viable female infant delivered in cephalic presentation at 1820, placenta delivered at 1825.  Apgar scores 9/9   Weight will be recorded after 1 hour to allow for skin-to-skin contact.  Laceration(s): No laceration   Repair: N/A  EBL: 182  Complications: No complications     As mentioned in the event note, due to an ongoing emergency I arrived in the patient's room 2 minutes after the baby was born and prior to the delivery of the placenta. Agree with above.    Procedure:   Patient felt rectal pressure and was found to be fully dilated, +2 station. She pushed effectively for 30 minutes. She delivered a viable female infant. Thick meconium was noted cord clamping was performed and baby was brought over to the warmer. Placenta delivered intact and pitocin was started at the delivery of placenta.  Perineum and vagina were inspected, no lacerations present. Intermittent lower uterine segment atony was noted. She received 1 dose of methergine IM. Adequate hemostasis was obtained.

## 2022-09-03 NOTE — OB PROVIDER LABOR PROGRESS NOTE - NS_OBIHIFHRDETAILS_OBGYN_ALL_OB_FT
155 bpm, moderate variability, + accels, -decels
baseline 140s, moderate variability, +accels, -decels
125bpm, reactive
baseline 135, moderate variability, +accels, -decels
baseline 130s, moderate variability, +accels, -decels

## 2022-09-04 LAB
HCT VFR BLD CALC: 29.3 % — LOW (ref 34.5–45)
HGB BLD-MCNC: 9.3 G/DL — LOW (ref 11.5–15.5)

## 2022-09-04 RX ORDER — IBUPROFEN 200 MG
1 TABLET ORAL
Qty: 12 | Refills: 0
Start: 2022-09-04 | End: 2022-09-06

## 2022-09-04 RX ORDER — ACETAMINOPHEN 500 MG
2 TABLET ORAL
Qty: 24 | Refills: 0
Start: 2022-09-04 | End: 2022-09-06

## 2022-09-04 RX ORDER — BENZOYL PEROXIDE MICRONIZED 5.8 %
1 TOWELETTE (EA) TOPICAL
Qty: 0 | Refills: 0 | DISCHARGE

## 2022-09-04 RX ADMIN — Medication 975 MILLIGRAM(S): at 08:28

## 2022-09-04 RX ADMIN — Medication 600 MILLIGRAM(S): at 12:31

## 2022-09-04 RX ADMIN — Medication 600 MILLIGRAM(S): at 12:00

## 2022-09-04 RX ADMIN — Medication 975 MILLIGRAM(S): at 20:47

## 2022-09-04 RX ADMIN — Medication 975 MILLIGRAM(S): at 03:10

## 2022-09-04 RX ADMIN — Medication 975 MILLIGRAM(S): at 14:38

## 2022-09-04 RX ADMIN — Medication 600 MILLIGRAM(S): at 17:15

## 2022-09-04 RX ADMIN — Medication 600 MILLIGRAM(S): at 06:45

## 2022-09-04 RX ADMIN — Medication 1 TABLET(S): at 12:31

## 2022-09-04 RX ADMIN — SODIUM CHLORIDE 3 MILLILITER(S): 9 INJECTION INTRAMUSCULAR; INTRAVENOUS; SUBCUTANEOUS at 06:22

## 2022-09-04 RX ADMIN — Medication 975 MILLIGRAM(S): at 15:00

## 2022-09-04 RX ADMIN — Medication 975 MILLIGRAM(S): at 09:20

## 2022-09-05 VITALS
RESPIRATION RATE: 18 BRPM | SYSTOLIC BLOOD PRESSURE: 99 MMHG | DIASTOLIC BLOOD PRESSURE: 65 MMHG | HEART RATE: 87 BPM | TEMPERATURE: 98 F | OXYGEN SATURATION: 98 %

## 2022-09-05 DIAGNOSIS — D62 ACUTE POSTHEMORRHAGIC ANEMIA: ICD-10-CM

## 2022-09-05 PROCEDURE — 85025 COMPLETE CBC W/AUTO DIFF WBC: CPT

## 2022-09-05 PROCEDURE — U0005: CPT

## 2022-09-05 PROCEDURE — 86850 RBC ANTIBODY SCREEN: CPT

## 2022-09-05 PROCEDURE — 85018 HEMOGLOBIN: CPT

## 2022-09-05 PROCEDURE — 86780 TREPONEMA PALLIDUM: CPT

## 2022-09-05 PROCEDURE — 86769 SARS-COV-2 COVID-19 ANTIBODY: CPT

## 2022-09-05 PROCEDURE — 36415 COLL VENOUS BLD VENIPUNCTURE: CPT

## 2022-09-05 PROCEDURE — 85014 HEMATOCRIT: CPT

## 2022-09-05 PROCEDURE — U0003: CPT

## 2022-09-05 PROCEDURE — 86901 BLOOD TYPING SEROLOGIC RH(D): CPT

## 2022-09-05 PROCEDURE — 86900 BLOOD TYPING SEROLOGIC ABO: CPT

## 2022-09-05 RX ADMIN — Medication 600 MILLIGRAM(S): at 18:45

## 2022-09-05 RX ADMIN — Medication 1 TABLET(S): at 12:37

## 2022-09-05 RX ADMIN — Medication 600 MILLIGRAM(S): at 05:41

## 2022-09-05 RX ADMIN — Medication 600 MILLIGRAM(S): at 00:15

## 2022-09-05 RX ADMIN — Medication 600 MILLIGRAM(S): at 18:14

## 2022-09-05 RX ADMIN — Medication 600 MILLIGRAM(S): at 12:37

## 2022-09-05 RX ADMIN — Medication 600 MILLIGRAM(S): at 13:10

## 2022-09-05 NOTE — PROGRESS NOTE ADULT - ATTENDING COMMENTS
31y  now PPD#1 s/p spontaneous vaginal delivery at 40.3 weeks gestation. pt doing well. pain controlled. Ayad diet, + amb, + void  VSS  PPD#1 s/p VD doing well   - pain control   - encourage amb  - routine PP care    Sulma Ladd MD
31y  now stable PPD#2 s/p spontaneous vaginal delivery, no s/s anemia. Discharge instructions reviewed, encouraged PO iron supplementation.

## 2022-09-05 NOTE — PROGRESS NOTE ADULT - SUBJECTIVE AND OBJECTIVE BOX
FELICE ASENCIO is a 31y  now PPD#2 s/p spontaneous vaginal delivery at 40w3d gestation, IOL for oligo, delivery uncomplicated.    S:    No acute events overnight.   The patient has no complaints.  Pain controlled with current treatment regimen.   She is ambulating without difficulty and tolerating PO.   + flatus/-BM/+ voiding   She endorses appropriate lochia, which is decreasing.   She is breastfeeding without difficulty.   She denies fevers, chills, nausea and vomiting.   She denies lightheadedness, dizziness, palpitations, chest pain and SOB.     O:    T(C): 36.7 (22 @ 03:36), Max: 36.7 (22 @ 16:31)  HR: 78 (22 @ 03:36) (78 - 78)  BP: 102/64 (22 @ 03:36) (99/65 - 102/64)  RR: 18 (22 @ 03:36) (18 - 18)  SpO2: 98% (22 @ 03:36) (97% - 98%)    Gen: NAD, AOx3  CV: RRR, S1/S2 present  Pulm: CTAB  Abdomen:  Soft, non-tender, non-distended, +bowel sounds  Uterus:  Fundus firm below umbilicus  VE:  Expectant lochia  Ext:  Non-tender and non-edematous                          9.3    x     )-----------( x        ( 04 Sep 2022 04:51 )             29.3     
FELICE ASENCIO is a 31y  now PPD#1 s/p spontaneous vaginal delivery at 40.3 weeks gestation, uncomplicated.    S:    The patient has no complaints.  Pain controlled with current treatment regimen.   She is ambulating without difficulty and tolerating PO.   + flatus/-BM/+ voiding   She endorses appropriate lochia, which is decreasing.     O:    T(C): 36.8 (22 @ 03:19), Max: 37.2 (22 @ 21:13)  HR: 76 (22 @ 03:19) (57 - 106)  BP: 94/57 (22 @ 03:19) (80/50 - 131/68)  RR: 18 (22 @ 03:19) (16 - 18)  SpO2: 97% (22 @ 03:19) (97% - 97%)    Gen: NAD, AOx3  CV: RRR  Pulm: CTAB  Breast: Nontender, non-engorged   Abdomen:  Soft, non-tender, non-distended, +bowel sounds  Uterus:  Fundus firm below umbilicus  VE:  +Lochia  Ext:  Non-tender and non-edematous                          11.1   10.18 )-----------( 231      ( 02 Sep 2022 20:10 )             34.0

## 2022-09-05 NOTE — PROGRESS NOTE ADULT - ASSESSMENT
A/P: 31y  now PPD#1 s/p spontaneous vaginal delivery at 40.3 weeks gestation, uncomplicated.  -Vital signs stable  -Hgb: 11.1 -> AM labs pending   -Voiding, tolerating PO, bowel function nml   -Advance care as tolerated   -Continue routine postpartum care and education  -Healthy female infant  -Dispo: Patient to be discharged when meeting all postpartum milestones and pending attending approval. 
A/P:  FELICE ASENCIO is a 31y  now PPD#2 s/p spontaneous vaginal delivery at 40w3d gestation, IOL for oligo, delivery uncomplicated.  -Vital signs stable  -Hgb: 11.1>9.3   -Voiding, tolerating PO, bowel function nml   -Advance care as tolerated   -Continue routine postpartum care and education  -Healthy female infant  -Dispo: Pt is stable, doing well and meeting all postpartum and postoperative milestones. Possible discharge to home today pending attending approval.

## 2022-10-18 ENCOUNTER — APPOINTMENT (OUTPATIENT)
Dept: OBGYN | Facility: CLINIC | Age: 32
End: 2022-10-18

## 2022-10-18 VITALS
HEIGHT: 61 IN | SYSTOLIC BLOOD PRESSURE: 106 MMHG | BODY MASS INDEX: 24.92 KG/M2 | WEIGHT: 132 LBS | DIASTOLIC BLOOD PRESSURE: 70 MMHG

## 2022-10-18 PROCEDURE — 99213 OFFICE O/P EST LOW 20 MIN: CPT | Mod: TH

## 2022-10-18 NOTE — HISTORY OF PRESENT ILLNESS
[Postpartum Follow Up] : postpartum follow up [Complications:___] : no complications [] : delivered by vaginal delivery [Breastfeeding] : not currently nursing [S/Sx PP Depression] : no signs/symptoms of postpartum depression [Erythema] : not erythematous [Back to Normal] : is back to normal in size [Mild] : mild vaginal bleeding [Normal] : the vagina was normal [Cervix Sample Taken] : cervical sample not taken for a Pap smear [Not Done] : Examination of breasts not done [Doing Well] : is doing well [No Sign of Infection] : is showing no signs of infection [Excellent Pain Control] : has excellent pain control [None] : None [de-identified] : denies depression. declines contraception.

## 2022-11-21 ENCOUNTER — APPOINTMENT (OUTPATIENT)
Dept: OBGYN | Facility: CLINIC | Age: 32
End: 2022-11-21

## 2022-11-21 ENCOUNTER — NON-APPOINTMENT (OUTPATIENT)
Age: 32
End: 2022-11-21

## 2022-11-21 VITALS
SYSTOLIC BLOOD PRESSURE: 100 MMHG | BODY MASS INDEX: 24.92 KG/M2 | DIASTOLIC BLOOD PRESSURE: 64 MMHG | WEIGHT: 132 LBS | HEIGHT: 61 IN

## 2022-11-21 DIAGNOSIS — Z00.00 ENCOUNTER FOR GENERAL ADULT MEDICAL EXAMINATION W/OUT ABNORMAL FINDINGS: ICD-10-CM

## 2022-11-21 PROCEDURE — 99395 PREV VISIT EST AGE 18-39: CPT

## 2022-11-21 NOTE — HISTORY OF PRESENT ILLNESS
[FreeTextEntry1] : 30 yo p1 here for annual exam. Had nsd in sept, breastfeeding baby, no menses yet. \par pt's fam [place]\par declines contraceptives\par no gyn co.

## 2022-12-02 LAB — CYTOLOGY CVX/VAG DOC THIN PREP: NORMAL

## 2024-10-03 NOTE — OB RN DELIVERY SUMMARY - NS_GBSABX_OBGYN_ALL_OB
Detail Level: Zone Price (Do Not Change): 0.00 Instructions: This plan will send the code FBSE to the PM system.  DO NOT or CHANGE the price. N/A

## 2025-05-13 NOTE — ED PROVIDER NOTE - NSCAREINITIATED _GEN_ER
Primary Care/Behavioral Health Integration - Program Case Closure    Patient referred to behavioral health by her primary care provider, Carlos Weaver MD, for concerns related to behavioral health.      Second attempt: A letter was sent introducing services after second telephone attempt.    Third attempt: Left a brief message with contact information and requested a callback at her earliest convenience.    The case has been closed on 5/13/2025 for the following reason(s): Patient has not returned call(s) to begin program    
Elida Jon(PA)